# Patient Record
Sex: FEMALE | Race: WHITE | NOT HISPANIC OR LATINO | Employment: UNEMPLOYED | URBAN - NONMETROPOLITAN AREA
[De-identification: names, ages, dates, MRNs, and addresses within clinical notes are randomized per-mention and may not be internally consistent; named-entity substitution may affect disease eponyms.]

---

## 2023-01-31 ENCOUNTER — HOSPITAL ENCOUNTER (EMERGENCY)
Facility: HOSPITAL | Age: 53
Discharge: HOME | End: 2023-01-31
Attending: STUDENT IN AN ORGANIZED HEALTH CARE EDUCATION/TRAINING PROGRAM | Admitting: STUDENT IN AN ORGANIZED HEALTH CARE EDUCATION/TRAINING PROGRAM
Payer: COMMERCIAL

## 2023-01-31 ENCOUNTER — OFFICE VISIT (OUTPATIENT)
Dept: UROLOGY | Facility: CLINIC | Age: 53
End: 2023-01-31
Payer: COMMERCIAL

## 2023-01-31 ENCOUNTER — APPOINTMENT (OUTPATIENT)
Dept: CT IMAGING | Facility: HOSPITAL | Age: 53
End: 2023-01-31
Payer: COMMERCIAL

## 2023-01-31 VITALS
HEART RATE: 72 BPM | HEIGHT: 66 IN | DIASTOLIC BLOOD PRESSURE: 94 MMHG | SYSTOLIC BLOOD PRESSURE: 140 MMHG | BODY MASS INDEX: 33.75 KG/M2 | WEIGHT: 210 LBS

## 2023-01-31 VITALS
OXYGEN SATURATION: 94 % | BODY MASS INDEX: 33.75 KG/M2 | TEMPERATURE: 98.1 F | RESPIRATION RATE: 18 BRPM | HEART RATE: 68 BPM | HEIGHT: 66 IN | DIASTOLIC BLOOD PRESSURE: 94 MMHG | WEIGHT: 210 LBS | SYSTOLIC BLOOD PRESSURE: 142 MMHG

## 2023-01-31 DIAGNOSIS — N20.1 LEFT URETERAL STONE: Primary | ICD-10-CM

## 2023-01-31 DIAGNOSIS — R10.9 ACUTE LEFT FLANK PAIN: ICD-10-CM

## 2023-01-31 DIAGNOSIS — N20.0 NEPHROLITHIASIS: Primary | ICD-10-CM

## 2023-01-31 DIAGNOSIS — N30.01 ACUTE CYSTITIS WITH HEMATURIA: ICD-10-CM

## 2023-01-31 LAB
ALBUMIN SERPL-MCNC: 4.1 G/DL (ref 3.5–5.2)
ALBUMIN/GLOB SERPL: 1.3 G/DL
ALP SERPL-CCNC: 96 U/L (ref 39–117)
ALT SERPL W P-5'-P-CCNC: 28 U/L (ref 1–33)
ANION GAP SERPL CALCULATED.3IONS-SCNC: 10.4 MMOL/L (ref 5–15)
AST SERPL-CCNC: 25 U/L (ref 1–32)
BACTERIA UR QL AUTO: ABNORMAL /HPF
BASOPHILS # BLD AUTO: 0.06 10*3/MM3 (ref 0–0.2)
BASOPHILS NFR BLD AUTO: 0.4 % (ref 0–1.5)
BILIRUB SERPL-MCNC: 0.5 MG/DL (ref 0–1.2)
BILIRUB UR QL STRIP: ABNORMAL
BUN SERPL-MCNC: 17 MG/DL (ref 6–20)
BUN/CREAT SERPL: 14.7 (ref 7–25)
CALCIUM SPEC-SCNC: 9 MG/DL (ref 8.6–10.5)
CHLORIDE SERPL-SCNC: 108 MMOL/L (ref 98–107)
CLARITY UR: ABNORMAL
CO2 SERPL-SCNC: 23.6 MMOL/L (ref 22–29)
COD CRY URNS QL: ABNORMAL /HPF
COLOR UR: ABNORMAL
CREAT SERPL-MCNC: 1.16 MG/DL (ref 0.57–1)
DEPRECATED RDW RBC AUTO: 44.1 FL (ref 37–54)
EGFRCR SERPLBLD CKD-EPI 2021: 57.2 ML/MIN/1.73
EOSINOPHIL # BLD AUTO: 0.04 10*3/MM3 (ref 0–0.4)
EOSINOPHIL NFR BLD AUTO: 0.3 % (ref 0.3–6.2)
ERYTHROCYTE [DISTWIDTH] IN BLOOD BY AUTOMATED COUNT: 12.7 % (ref 12.3–15.4)
GLOBULIN UR ELPH-MCNC: 3.2 GM/DL
GLUCOSE SERPL-MCNC: 124 MG/DL (ref 65–99)
GLUCOSE UR STRIP-MCNC: NEGATIVE MG/DL
HCT VFR BLD AUTO: 45.4 % (ref 34–46.6)
HGB BLD-MCNC: 15.1 G/DL (ref 12–15.9)
HGB UR QL STRIP.AUTO: ABNORMAL
HYALINE CASTS UR QL AUTO: ABNORMAL /LPF
IMM GRANULOCYTES # BLD AUTO: 0.04 10*3/MM3 (ref 0–0.05)
IMM GRANULOCYTES NFR BLD AUTO: 0.3 % (ref 0–0.5)
KETONES UR QL STRIP: ABNORMAL
LEUKOCYTE ESTERASE UR QL STRIP.AUTO: NEGATIVE
LIPASE SERPL-CCNC: 20 U/L (ref 13–60)
LYMPHOCYTES # BLD AUTO: 1.34 10*3/MM3 (ref 0.7–3.1)
LYMPHOCYTES NFR BLD AUTO: 9.7 % (ref 19.6–45.3)
MCH RBC QN AUTO: 31.3 PG (ref 26.6–33)
MCHC RBC AUTO-ENTMCNC: 33.3 G/DL (ref 31.5–35.7)
MCV RBC AUTO: 94.2 FL (ref 79–97)
MONOCYTES # BLD AUTO: 0.64 10*3/MM3 (ref 0.1–0.9)
MONOCYTES NFR BLD AUTO: 4.6 % (ref 5–12)
MUCOUS THREADS URNS QL MICRO: ABNORMAL /HPF
NEUTROPHILS NFR BLD AUTO: 11.7 10*3/MM3 (ref 1.7–7)
NEUTROPHILS NFR BLD AUTO: 84.7 % (ref 42.7–76)
NITRITE UR QL STRIP: NEGATIVE
NRBC BLD AUTO-RTO: 0 /100 WBC (ref 0–0.2)
PH UR STRIP.AUTO: <=5 [PH] (ref 5–8)
PLATELET # BLD AUTO: 194 10*3/MM3 (ref 140–450)
PMV BLD AUTO: 11.2 FL (ref 6–12)
POTASSIUM SERPL-SCNC: 4.1 MMOL/L (ref 3.5–5.2)
PROT SERPL-MCNC: 7.3 G/DL (ref 6–8.5)
PROT UR QL STRIP: ABNORMAL
RBC # BLD AUTO: 4.82 10*6/MM3 (ref 3.77–5.28)
RBC # UR STRIP: ABNORMAL /HPF
REF LAB TEST METHOD: ABNORMAL
SODIUM SERPL-SCNC: 142 MMOL/L (ref 136–145)
SP GR UR STRIP: >1.03 (ref 1–1.03)
SQUAMOUS #/AREA URNS HPF: ABNORMAL /HPF
UROBILINOGEN UR QL STRIP: ABNORMAL
WBC # UR STRIP: ABNORMAL /HPF
WBC NRBC COR # BLD: 13.82 10*3/MM3 (ref 3.4–10.8)

## 2023-01-31 PROCEDURE — 87086 URINE CULTURE/COLONY COUNT: CPT | Performed by: STUDENT IN AN ORGANIZED HEALTH CARE EDUCATION/TRAINING PROGRAM

## 2023-01-31 PROCEDURE — 83690 ASSAY OF LIPASE: CPT | Performed by: STUDENT IN AN ORGANIZED HEALTH CARE EDUCATION/TRAINING PROGRAM

## 2023-01-31 PROCEDURE — 25010000002 MORPHINE PER 10 MG: Performed by: STUDENT IN AN ORGANIZED HEALTH CARE EDUCATION/TRAINING PROGRAM

## 2023-01-31 PROCEDURE — 74176 CT ABD & PELVIS W/O CONTRAST: CPT

## 2023-01-31 PROCEDURE — 96374 THER/PROPH/DIAG INJ IV PUSH: CPT

## 2023-01-31 PROCEDURE — 99284 EMERGENCY DEPT VISIT MOD MDM: CPT

## 2023-01-31 PROCEDURE — 99204 OFFICE O/P NEW MOD 45 MIN: CPT | Performed by: NURSE PRACTITIONER

## 2023-01-31 PROCEDURE — 81001 URINALYSIS AUTO W/SCOPE: CPT | Performed by: STUDENT IN AN ORGANIZED HEALTH CARE EDUCATION/TRAINING PROGRAM

## 2023-01-31 PROCEDURE — 25010000002 KETOROLAC TROMETHAMINE PER 15 MG: Performed by: STUDENT IN AN ORGANIZED HEALTH CARE EDUCATION/TRAINING PROGRAM

## 2023-01-31 PROCEDURE — 80053 COMPREHEN METABOLIC PANEL: CPT | Performed by: STUDENT IN AN ORGANIZED HEALTH CARE EDUCATION/TRAINING PROGRAM

## 2023-01-31 PROCEDURE — 96375 TX/PRO/DX INJ NEW DRUG ADDON: CPT

## 2023-01-31 PROCEDURE — 85025 COMPLETE CBC W/AUTO DIFF WBC: CPT | Performed by: STUDENT IN AN ORGANIZED HEALTH CARE EDUCATION/TRAINING PROGRAM

## 2023-01-31 PROCEDURE — 96376 TX/PRO/DX INJ SAME DRUG ADON: CPT

## 2023-01-31 RX ORDER — TAMSULOSIN HYDROCHLORIDE 0.4 MG/1
1 CAPSULE ORAL DAILY
Qty: 30 CAPSULE | Refills: 5 | Status: SHIPPED | OUTPATIENT
Start: 2023-01-31

## 2023-01-31 RX ORDER — GABAPENTIN 600 MG/1
1 TABLET ORAL 3 TIMES DAILY
COMMUNITY
Start: 2022-12-27

## 2023-01-31 RX ORDER — HYDROCODONE BITARTRATE AND ACETAMINOPHEN 5; 325 MG/1; MG/1
1 TABLET ORAL EVERY 8 HOURS PRN
Qty: 12 TABLET | Refills: 0 | Status: SHIPPED | OUTPATIENT
Start: 2023-01-31 | End: 2023-02-07

## 2023-01-31 RX ORDER — CEFDINIR 300 MG/1
300 CAPSULE ORAL 2 TIMES DAILY
Qty: 20 CAPSULE | Refills: 0 | Status: SHIPPED | OUTPATIENT
Start: 2023-01-31 | End: 2023-02-10

## 2023-01-31 RX ORDER — KETOROLAC TROMETHAMINE 10 MG/1
10 TABLET, FILM COATED ORAL EVERY 6 HOURS PRN
Qty: 16 TABLET | Refills: 2 | Status: SHIPPED | OUTPATIENT
Start: 2023-01-31

## 2023-01-31 RX ORDER — CEFDINIR 300 MG/1
300 CAPSULE ORAL ONCE
Status: COMPLETED | OUTPATIENT
Start: 2023-01-31 | End: 2023-01-31

## 2023-01-31 RX ORDER — POLYETHYLENE GLYCOL 3350 17 G/17G
17 POWDER, FOR SOLUTION ORAL DAILY
Qty: 30 EACH | Refills: 3 | Status: SHIPPED | OUTPATIENT
Start: 2023-01-31

## 2023-01-31 RX ORDER — KETOROLAC TROMETHAMINE 30 MG/ML
30 INJECTION, SOLUTION INTRAMUSCULAR; INTRAVENOUS EVERY 6 HOURS PRN
Status: DISCONTINUED | OUTPATIENT
Start: 2023-01-31 | End: 2023-01-31 | Stop reason: HOSPADM

## 2023-01-31 RX ADMIN — KETOROLAC TROMETHAMINE 30 MG: 30 INJECTION, SOLUTION INTRAMUSCULAR; INTRAVENOUS at 04:11

## 2023-01-31 RX ADMIN — MORPHINE SULFATE 4 MG: 4 INJECTION, SOLUTION INTRAMUSCULAR; INTRAVENOUS at 04:12

## 2023-01-31 RX ADMIN — SODIUM CHLORIDE 1000 ML: 9 INJECTION, SOLUTION INTRAVENOUS at 04:10

## 2023-01-31 RX ADMIN — CEFDINIR 300 MG: 300 CAPSULE ORAL at 06:48

## 2023-01-31 RX ADMIN — MORPHINE SULFATE 4 MG: 4 INJECTION, SOLUTION INTRAMUSCULAR; INTRAVENOUS at 05:42

## 2023-01-31 NOTE — PROGRESS NOTES
"Chief Complaint  LEFT URETERAL STONE/FLANK PAIN (NEW PATIENT WITH LEFT UVJ STONE/FLANK AND ABDOMINAL PAIN)    Subjective          Jyoti Stephenson presents to [facility] for  7 MM RIGHT UVJ STONE/ABD/FLANK PAIN  History of Present Illness     Mrs. Jyoti Stephenson is a pleasant 52-year-old female who presents to clinic today for evaluation as a new patient consult. She is an emergency room follow-up for kidney stones. Patient apparently presented to [facility] emergency department yesterday, 01/30/2023, secondary to very sharp 10 on 10 aching and very consistent left flank pain/left lower quadrant abdominal pain that was radiating to her suprapubic region causing her significant nausea, vomiting, pelvic pain and discomfort.     She had a CT abdomen and pelvis done THAT WE BOTH REVIEWED that showed 7 mm obstructing stone at the left ureterovesical junction producing moderate left-sided hydroureteronephrosis and extensive left perinephric inflammatory stranding.     On presentation to the clinic this morning, she is in NO apparent discomfort and reports doing relatively better overall. She is unable to give us any urine for analysis today but currently on cefdinir antibiotics per emergency room for acute cystitis and hematuria. She states she is NOT desirous of surgical intervention at this time. STATES SHE \"WOULD LIKE TO HAVE HER PROCEDURE DONE IN [place], AND HER  IS ON HIS WAY TO GET HER\".     She states her  drives a truck and on her way through they were at the [facility]. She experienced pain that would not subside and continued to worsen. She began to feel nauseous and vomited. She denies being shown the stone in the emergency room. She states her pain has improved. She denies having an IUD in place. She states yesterday, 01/20/2023 she had diarrhea. She denies burning with urination, frequency, or urgency. She denies pain in her lower back. She " "denies passing any stones.    She states the last time she had a cholecystectomy, she was coming through [place].    She states she drinks Coke.    Objective   Vital Signs:   /94   Pulse 72   Ht 167.6 cm (66\")   Wt 95.3 kg (210 lb)   BMI 33.89 kg/m²       ROS:   Review of Systems   Constitutional: Positive for fatigue. Negative for activity change, appetite change, diaphoresis, fever, unexpected weight gain and unexpected weight loss.   HENT: Negative for congestion, ear discharge, ear pain, nosebleeds, rhinorrhea, sinus pressure and sore throat.    Eyes: Negative for blurred vision, double vision, photophobia, pain, redness and visual disturbance.   Respiratory: Negative for apnea, cough, chest tightness, shortness of breath, wheezing and stridor.    Cardiovascular: Negative for chest pain and palpitations.   Gastrointestinal: Positive for abdominal pain. Negative for abdominal distention, constipation, diarrhea, nausea and vomiting.   Endocrine: Negative for polydipsia, polyphagia and polyuria.   Genitourinary: Positive for flank pain and frequency. Negative for decreased urine volume, difficulty urinating, dysuria, hematuria, urgency and urinary incontinence.   Musculoskeletal: Positive for back pain. Negative for arthralgias and joint swelling.   Skin: Negative for pallor, rash and wound.   Neurological: Negative for dizziness, tremors, syncope, weakness, light-headedness, headache, memory problem and confusion.   Hematological: Bruises/bleeds easily.   Psychiatric/Behavioral: Negative for behavioral problems. The patient is nervous/anxious.         Physical Exam  Constitutional:       General: She is in acute distress.      Appearance: She is well-developed. She is obese. She is ill-appearing.   HENT:      Head: Normocephalic and atraumatic.   Eyes:      Pupils: Pupils are equal, round, and reactive to light.   Neck:      Thyroid: No thyromegaly.      Trachea: No tracheal deviation.   Cardiovascular: "      Rate and Rhythm: Normal rate and regular rhythm.      Heart sounds: No murmur heard.  Pulmonary:      Effort: Pulmonary effort is normal. No respiratory distress.      Breath sounds: Normal breath sounds. No stridor. No wheezing.   Abdominal:      General: Bowel sounds are normal. There is distension.      Palpations: Abdomen is soft.      Tenderness: There is abdominal tenderness. There is guarding.   Genitourinary:     Labia:         Right: No tenderness.         Left: No tenderness.       Vagina: Normal. No vaginal discharge.   Musculoskeletal:         General: Tenderness present. No deformity. Normal range of motion.      Cervical back: Normal range of motion.   Skin:     General: Skin is warm and dry.      Capillary Refill: Capillary refill takes less than 2 seconds.      Coloration: Skin is pale.      Findings: No erythema or rash.   Neurological:      Mental Status: She is alert and oriented to person, place, and time.      Cranial Nerves: No cranial nerve deficit.      Sensory: No sensory deficit.      Coordination: Coordination normal.   Psychiatric:         Behavior: Behavior normal.         Thought Content: Thought content normal.         Judgment: Judgment normal.        Result Review :     UA    Urinalysis 1/31/23 1/31/23    [id number]   Squamous Epithelial Cells, UA 3-6 (A)    Specific Gravity, UA  >1.030 (A)   Ketones, UA  15 mg/dL (1+) (A)   Blood, UA  Small (1+) (A)   Leukocytes, UA  Negative   Nitrite, UA  Negative   RBC, UA 0-2    WBC, UA 6-12 (A)    Bacteria, UA 1+ (A)    (A) Abnormal value                     Assessment and Plan    Problem List Items Addressed This Visit    None  Visit Diagnoses     Left ureteral stone    -  Primary    Relevant Medications    tamsulosin (FLOMAX) 0.4 MG capsule 24 hr capsule    ketorolac (TORADOL) 10 MG tablet    polyethylene glycol (MiraLax) 17 g packet    Acute left flank pain        Relevant Medications    tamsulosin (FLOMAX) 0.4 MG capsule 24 hr capsule     ketorolac (TORADOL) 10 MG tablet    polyethylene glycol (MiraLax) 17 g packet    Acute cystitis with hematuria        Relevant Medications    tamsulosin (FLOMAX) 0.4 MG capsule 24 hr capsule    ketorolac (TORADOL) 10 MG tablet    polyethylene glycol (MiraLax) 17 g packet         ASSESSMENT         Left Ureteral Calculus/FLANK PAIN/ACUTE CYSTITIS    MS NATA FINN IS A 52 YEAR OLD Patient Who presents to clinic today for evaluation., post ER visit for her first  KIDNEY STONE,  WITH abdominal/flank pain with N/V/D-RESOLVED.  She is in no apparent discomfort today and reports doing relatively better.  However she reports not passing the stone yet.     We have discussed the various parameters regarding spontaneous passage including the notion that a tiny 1 -4 mm stone like he has, has a 95% high likelihood of spontaneous passage versus a larger stone of greater than 7 mm at the UVJ junction like she has, being caught up in the upper areas of the urinary tract. We also discussed the medical management of stone disease and the use of medical expulsive therapy in the form of Flomax. This is used in an off label setting. I also talked about nonoperative management including ambulation and increasing fluids and hot tub as being an effective adjuncts in the treatment of a ureteral stone.         We ALSO discussed the indicators for intervention including  absolute indicators such as sepsis and uncontrollable severe pain as well as  the relative indicators of moderate pain that is well-controlled with various analgesia.       PLAN  I Discussed this case with Dr Aiken and he is Agreeable WITH plan of care; TO PROCEED WITH A LEFT URETEROSCOPY LASER LITHOTRIPSY IN AM 02/01/2022.     NEVERTHELESS, PATIENT HAS DECLINE PROCEDURE. STATES, She is too far from home and  would like to get her stone done in [facility]. (Pt is on a road trip with [employer] ).    Patient have adequate narcotic pain medication hydrocodone 5/325  given in ER.    SHe has been given nonnarcotic pain medication and advised to continue with Toradol for breakthrough pain as needed.      Also  Given nausea medicine, Flomax for medical expulsive therapy    She has been encouraged to strain all urinary output, and sending stone for analysis if Passed.     We discussed  Other treatment options for her flank pain with patient encouraged to continue conservative therapy alternating NSAID/Tylenol as tolerated, Also including hot baths, showers, warm compresses to lower back as tolerated. Also encouraged walking, physical therapy, light exercises as tolerated    Rest is the most important treatment in the case of flank pain. Rest and physical therapy are enough to improve minor pain. Discussed to monitor her daily routine with prevention of flank pain by: At least Drinking 8 glasses of water per day, Limiting your alcohol consumption.  Having a healthy diet containing fruits, vegetables, and a lot of fluids, Practicing safe sex.  Also maintaining proper hygiene of your body as well as the environment.    Discussed a kidney stone prevention diet to include increasing p.o. fluid intake, to at least 1 to 2 L of water daily.  She is to avoid caffeine products such as cola, coffee, and to avoid soft or soda drinks.  She is to decrease her sodium consumption as in  Fast foods, driscoll, salted nuts, canned foods, and smoked/cured foods. She is also to decrease her oxalate consumption, as in spinach, Ashley greens, and Rhubarb.  Also important is to decrease protein intake, as in red meats, peanut butter, and also avoid nuts.    Follow up on as needed basis,     Patient is agreeable to plan of care    Patient reports that she is not currently experiencing any symptoms of urinary incontinence.    BMI is >= 30 and <35. (Class 1 Obesity). The following options were offered after discussion;: weight loss educational material (shared in after visit summary), exercise  counseling/recommendations and nutrition counseling/recommendations      RADIOLOGY (CT AND/OR KUB):    CT Abdomen and Pelvis: No results found for this or any previous visit.     CT Stone Protocol: Results for orders placed during the hospital encounter of 01/31/23    CT Abdomen Pelvis Stone Protocol    Narrative  CT Abdomen Pelvis WO    INDICATION:  Lower back pain and flank pain.    TECHNIQUE:  CT of the abdomen and pelvis without IV contrast. Coronal and sagittal reconstructions were obtained.  Radiation dose reduction techniques included automated exposure control or exposure modulation based on body size. Count of known CT and cardiac nuc  med studies performed in previous 12 months: 0.    COMPARISON:  None available.    FINDINGS:  Visualized lung bases are unremarkable.    Abdomen:  The liver is within normal limits. The spleen and pancreas are normal. Cholecystectomy. Both adrenal glands are normal. 7 mm obstructing stone at the left ureterovesical junction producing moderate left-sided hydroureteronephrosis and extensive left  perinephric inflammatory stranding. Abdominal aorta normal in course and caliber. Small bowel is unremarkable without obstruction. Normal appendix. The colon is unremarkable. No free fluid or free air.    Pelvis:  The urinary bladder is unremarkable.  Hysterectomy. No free pelvic fluid.    No acute osseous abnormality.    Impression  1. 7 mm obstructing stone at the left ureterovesical junction producing moderate left-sided hydroureteronephrosis and extensive left perinephric inflammatory stranding.  2. Normal appendix.  3. Cholecystectomy and hysterectomy.      Signer Name: Jayesh Flores MD  Signed: 1/31/2023 4:12 AM  Workstation Name: [id number]  [facility]     KUB: No results found for this or any previous visit.       LABS (3 MONTHS):    Admission on 01/31/2023, Discharged on 01/31/2023   Component Date Value Ref Range Status   • Glucose 01/31/2023 124  (H)  65 - 99 mg/dL Final   • BUN 01/31/2023 17  6 - 20 mg/dL Final   • Creatinine 01/31/2023 1.16 (H)  0.57 - 1.00 mg/dL Final   • Sodium 01/31/2023 142  136 - 145 mmol/L Final   • Potassium 01/31/2023 4.1  3.5 - 5.2 mmol/L Final    Slight hemolysis detected by analyzer. Results may be affected.   • Chloride 01/31/2023 108 (H)  98 - 107 mmol/L Final   • CO2 01/31/2023 23.6  22.0 - 29.0 mmol/L Final   • Calcium 01/31/2023 9.0  8.6 - 10.5 mg/dL Final   • Total Protein 01/31/2023 7.3  6.0 - 8.5 g/dL Final   • Albumin 01/31/2023 4.1  3.5 - 5.2 g/dL Final   • ALT (SGPT) 01/31/2023 28  1 - 33 U/L Final   • AST (SGOT) 01/31/2023 25  1 - 32 U/L Final   • Alkaline Phosphatase 01/31/2023 96  39 - 117 U/L Final   • Total Bilirubin 01/31/2023 0.5  0.0 - 1.2 mg/dL Final   • Globulin 01/31/2023 3.2  gm/dL Final   • A/G Ratio 01/31/2023 1.3  g/dL Final   • BUN/Creatinine Ratio 01/31/2023 14.7  7.0 - 25.0 Final   • Anion Gap 01/31/2023 10.4  5.0 - 15.0 mmol/L Final   • eGFR 01/31/2023 57.2 (L)  >60.0 mL/min/1.73 Final   • Lipase 01/31/2023 20  13 - 60 U/L Final   • Color, UA 01/31/2023 Dark Yellow (A)  Yellow, Straw Final   • Appearance, UA 01/31/2023 Cloudy (A)  Clear Final   • pH, UA 01/31/2023 <=5.0  5.0 - 8.0 Final   • Specific Gravity, UA 01/31/2023 >1.030 (H)  1.005 - 1.030 Final   • Glucose, UA 01/31/2023 Negative  Negative Final   • Ketones, UA 01/31/2023 15 mg/dL (1+) (A)  Negative Final   • Bilirubin, UA 01/31/2023 Small (1+) (A)  Negative Final   • Blood, UA 01/31/2023 Small (1+) (A)  Negative Final   • Protein, UA 01/31/2023 30 mg/dL (1+) (A)  Negative Final   • Leuk Esterase, UA 01/31/2023 Negative  Negative Final   • Nitrite, UA 01/31/2023 Negative  Negative Final   • Urobilinogen, UA 01/31/2023 1.0 E.U./dL  0.2 - 1.0 E.U./dL Final   • WBC 01/31/2023 13.82 (H)  3.40 - 10.80 10*3/mm3 Final   • RBC 01/31/2023 4.82  3.77 - 5.28 10*6/mm3 Final   • Hemoglobin 01/31/2023 15.1  12.0 - 15.9 g/dL Final   • Hematocrit  01/31/2023 45.4  34.0 - 46.6 % Final   • MCV 01/31/2023 94.2  79.0 - 97.0 fL Final   • MCH 01/31/2023 31.3  26.6 - 33.0 pg Final   • MCHC 01/31/2023 33.3  31.5 - 35.7 g/dL Final   • RDW 01/31/2023 12.7  12.3 - 15.4 % Final   • RDW-SD 01/31/2023 44.1  37.0 - 54.0 fl Final   • MPV 01/31/2023 11.2  6.0 - 12.0 fL Final   • Platelets 01/31/2023 194  140 - 450 10*3/mm3 Final   • Neutrophil % 01/31/2023 84.7 (H)  42.7 - 76.0 % Final   • Lymphocyte % 01/31/2023 9.7 (L)  19.6 - 45.3 % Final   • Monocyte % 01/31/2023 4.6 (L)  5.0 - 12.0 % Final   • Eosinophil % 01/31/2023 0.3  0.3 - 6.2 % Final   • Basophil % 01/31/2023 0.4  0.0 - 1.5 % Final   • Immature Grans % 01/31/2023 0.3  0.0 - 0.5 % Final   • Neutrophils, Absolute 01/31/2023 11.70 (H)  1.70 - 7.00 10*3/mm3 Final   • Lymphocytes, Absolute 01/31/2023 1.34  0.70 - 3.10 10*3/mm3 Final   • Monocytes, Absolute 01/31/2023 0.64  0.10 - 0.90 10*3/mm3 Final   • Eosinophils, Absolute 01/31/2023 0.04  0.00 - 0.40 10*3/mm3 Final   • Basophils, Absolute 01/31/2023 0.06  0.00 - 0.20 10*3/mm3 Final   • Immature Grans, Absolute 01/31/2023 0.04  0.00 - 0.05 10*3/mm3 Final   • nRBC 01/31/2023 0.0  0.0 - 0.2 /100 WBC Final   • RBC, UA 01/31/2023 0-2  None Seen, 0-2 /HPF Final   • WBC, UA 01/31/2023 6-12 (A)  None Seen, 0-2 /HPF Final   • Bacteria, UA 01/31/2023 1+ (A)  None Seen /HPF Final   • Squamous Epithelial Cells, UA 01/31/2023 3-6 (A)  None Seen, 0-2 /HPF Final   • Hyaline Casts, UA 01/31/2023 0-2  None Seen /LPF Final   • Calcium Oxalate Crystals, UA 01/31/2023 Large/3+  None Seen /HPF Final   • Mucus, UA 01/31/2023 Moderate/2+ (A)  None Seen, Trace /HPF Final   • Methodology 01/31/2023 Manual Light Microscopy   Final        Smoking Cessation Counseling:  Former smoker.  Patient does not currently use any tobacco products.     Follow Up   Return if symptoms worsen or fail to improve, for Next scheduled follow up for left 7 mm uvj stone/flank pain/abdominal pain/N/V/D.    Patient  was given instructions and counseling regarding her condition or for health maintenance advice. Please see specific information pulled into the AVS if appropriate.          This document has been electronically signed by Griselda Cheng-Akwa, APRN   January 31, 2023 11:38 EST      Dictated Utilizing Dragon Dictation: Part of this note may be an electronic transcription/translation of spoken language to printed text using the Dragon Dictation System.     Transcribed from ambient dictation for Griselda Cheng-Akwa, APRN by Naila Avila.  01/31/23   09:35 EST    Patient or patient representative verbalized consent to the visit recording.  I have personally performed the services described in this document as transcribed by the above individual, and it is both accurate and complete.  Griselda Cheng-Akwa, APRN  1/31/2023  11:38 EST

## 2023-02-01 LAB — BACTERIA SPEC AEROBE CULT: NORMAL
